# Patient Record
Sex: MALE | Race: WHITE
[De-identification: names, ages, dates, MRNs, and addresses within clinical notes are randomized per-mention and may not be internally consistent; named-entity substitution may affect disease eponyms.]

---

## 2022-06-15 ENCOUNTER — HOSPITAL ENCOUNTER (OUTPATIENT)
Dept: HOSPITAL 75 - PREOP | Age: 72
LOS: 2 days | Discharge: HOME | End: 2022-06-17
Attending: SURGERY
Payer: COMMERCIAL

## 2022-06-15 VITALS — BODY MASS INDEX: 27.77 KG/M2 | HEIGHT: 72.05 IN | WEIGHT: 205.03 LBS

## 2022-06-15 DIAGNOSIS — Z01.818: Primary | ICD-10-CM

## 2022-06-28 ENCOUNTER — HOSPITAL ENCOUNTER (OUTPATIENT)
Dept: HOSPITAL 75 - ENDO | Age: 72
Discharge: HOME | End: 2022-06-28
Attending: SURGERY
Payer: COMMERCIAL

## 2022-06-28 VITALS — SYSTOLIC BLOOD PRESSURE: 128 MMHG | DIASTOLIC BLOOD PRESSURE: 73 MMHG

## 2022-06-28 VITALS — SYSTOLIC BLOOD PRESSURE: 145 MMHG | DIASTOLIC BLOOD PRESSURE: 80 MMHG

## 2022-06-28 VITALS — DIASTOLIC BLOOD PRESSURE: 76 MMHG | SYSTOLIC BLOOD PRESSURE: 131 MMHG

## 2022-06-28 VITALS — SYSTOLIC BLOOD PRESSURE: 135 MMHG | DIASTOLIC BLOOD PRESSURE: 87 MMHG

## 2022-06-28 VITALS — WEIGHT: 205.03 LBS | BODY MASS INDEX: 27.77 KG/M2 | HEIGHT: 72.05 IN

## 2022-06-28 DIAGNOSIS — Z87.891: ICD-10-CM

## 2022-06-28 DIAGNOSIS — D12.3: ICD-10-CM

## 2022-06-28 DIAGNOSIS — Z12.11: Primary | ICD-10-CM

## 2022-06-28 DIAGNOSIS — D12.4: ICD-10-CM

## 2022-06-28 DIAGNOSIS — G47.33: ICD-10-CM

## 2022-06-28 DIAGNOSIS — Z79.01: ICD-10-CM

## 2022-06-28 DIAGNOSIS — Z79.52: ICD-10-CM

## 2022-06-28 PROCEDURE — 82947 ASSAY GLUCOSE BLOOD QUANT: CPT

## 2022-06-28 NOTE — PROGRESS NOTE-POST OPERATIVE
Post-Operative Progess Note


Surgeon (s)/Assistant (s)


Surgeon


MYNOR AUGUST DO


Assistant:  na





Pre-Operative Diagnosis


hx polyps





Post-Operative Diagnosis





colon polyps





Procedure & Operative Findings


Date of Procedure


6/28/22


Procedure Performed/Findings


colonoscopy c hot bx polypectomy x 2


Anesthesia Type


per crna





Estimated Blood Loss


Estimated blood loss (mL):  none





Specimens/Packing


Specimens Removed


colon polyps











MYNOR AUGUST DO              Jun 28, 2022 08:26

## 2022-06-28 NOTE — ANESTHESIA-GENERAL POST-OP
MAC


Patient Condition


Mental Status/LOC:  Same as Preop


Cardiovascular:  Satisfactory


Nausea/Vomiting:  Absent


Respiratory:  Satisfactory


Pain:  Controlled


Complications:  Absent





Post Op Complications


Complications


None





Follow Up Care/Instructions


Patient Instructions


None needed.





Anesthesiology Discharge Order


Discharge Order


Patient is doing well, no complaints, stable vital signs, no apparent adverse 

anesthesia problems.   


No complications reported per nursing.











NUVIA MOTLEY CRNA            Jun 28, 2022 08:17

## 2022-06-28 NOTE — OPERATIVE REPORT
DATE OF SERVICE:  06/28/2022



PREOPERATIVE DIAGNOSIS:

History of colon polyps.



POSTOPERATIVE DIAGNOSIS:

Colon polyps.



PROCEDURES PERFORMED:

Colonoscopy with hot biopsy polypectomy x2.



SURGEON:

Mynor Maria DO



ANESTHESIA:

Per CRNA.



ESTIMATED BLOOD LOSS:

None.



COMPLICATIONS:

None.



INDICATIONS FOR PROCEDURE:

The patient is a 72-year-old male with history of polyps.  He understands risks

and benefits of procedure and wishes to proceed.  Consent was signed in the

chart.



DESCRIPTION OF PROCEDURE:

The patient was taken to endoscopy suite and placed in a left lateral recumbent

position.  Timeout was performed.  Digital rectal exam was performed.  No

palpable polyps, masses or ulcerations.  Scope was inserted in the rectum and

advanced all the way to cecum with minimal difficulty.  Prep was adequate. 

Scope was slowly retracted back.  No polyps, masses or ulcerations in the cecum,

ascending colon.  In the transverse colon, a small polyp was present, which hot

biopsy polypectomy was performed.  Scope was then continuously and slowly

retracted back into the descending colon, another small polyp was present, which

hot biopsy polypectomy was performed.  Scope was then continuously and slowly

retracted back.  No polyps, masses or ulcerations within the remainder of the

descending and sigmoid colon.  Once in the rectum, scope was retroflexed noting

no other pathology.  Scope was returned to its normal position, slowly withdrawn

until completely removed.  The patient tolerated procedure well without any

complications and taken to the recovery room in stable condition.



RECOMMENDATIONS:

The patient will need repeat colonoscopy on as needed basis due to age.  If any

issues, be seen at that time.  The patient will follow up on pathology in two

weeks.





Job ID: [id number]

DocumentID: [id number]

Dictated Date:  06/28/2022 08:29:58

Transcription Date: 06/28/2022 13:19:54

Dictated By: MYNOR MARIA DO

## 2022-06-28 NOTE — DISCHARGE INST-SIMPLE/STANDARD
Discharge Inst-Standard


Patient Instructions/Follow Up


Plan of Care/Instructions/FU:  


2 weeks Candace


Activity as Tolerated:  Yes


Discharge Diet:  Regular Diet











MYNOR AUGUST DO              Jun 28, 2022 08:28